# Patient Record
Sex: FEMALE | Race: WHITE | Employment: PART TIME
[De-identification: names, ages, dates, MRNs, and addresses within clinical notes are randomized per-mention and may not be internally consistent; named-entity substitution may affect disease eponyms.]

---

## 2017-07-01 ENCOUNTER — HEALTH MAINTENANCE LETTER (OUTPATIENT)
Age: 27
End: 2017-07-01

## 2017-10-19 ENCOUNTER — TELEPHONE (OUTPATIENT)
Dept: FAMILY MEDICINE | Facility: CLINIC | Age: 27
End: 2017-10-19

## 2017-10-19 NOTE — TELEPHONE ENCOUNTER
Pt is past due for f/u pap smear.  Reminder letter was sent 03/10/17.  No answer and VM full. Will try again.  Liyah Giron,    Pap Tracking

## 2017-10-20 NOTE — TELEPHONE ENCOUNTER
2nd attempt to call pt.  No answer and VM still full.  Additional reminder letter sent.  Liyah Grion,    Pap Tracking

## 2017-11-10 NOTE — TELEPHONE ENCOUNTER
Letter was returned.  Attempted a final call to pt, went straight to VM and VM still full.  Liyah Giron,    Pap Tracking

## 2018-07-07 ENCOUNTER — HEALTH MAINTENANCE LETTER (OUTPATIENT)
Age: 28
End: 2018-07-07